# Patient Record
Sex: MALE | Race: OTHER | NOT HISPANIC OR LATINO | URBAN - METROPOLITAN AREA
[De-identification: names, ages, dates, MRNs, and addresses within clinical notes are randomized per-mention and may not be internally consistent; named-entity substitution may affect disease eponyms.]

---

## 2018-12-11 ENCOUNTER — EMERGENCY (EMERGENCY)
Facility: HOSPITAL | Age: 53
LOS: 1 days | Discharge: HOME | End: 2018-12-11
Attending: EMERGENCY MEDICINE
Payer: COMMERCIAL

## 2018-12-11 VITALS
SYSTOLIC BLOOD PRESSURE: 171 MMHG | TEMPERATURE: 98 F | WEIGHT: 143.96 LBS | OXYGEN SATURATION: 100 % | HEART RATE: 74 BPM | DIASTOLIC BLOOD PRESSURE: 116 MMHG | RESPIRATION RATE: 18 BRPM

## 2018-12-11 VITALS
SYSTOLIC BLOOD PRESSURE: 169 MMHG | TEMPERATURE: 98 F | RESPIRATION RATE: 17 BRPM | DIASTOLIC BLOOD PRESSURE: 92 MMHG | OXYGEN SATURATION: 98 % | HEART RATE: 84 BPM

## 2018-12-11 DIAGNOSIS — Z98.89 OTHER SPECIFIED POSTPROCEDURAL STATES: Chronic | ICD-10-CM

## 2018-12-11 DIAGNOSIS — N21.1 CALCULUS IN URETHRA: ICD-10-CM

## 2018-12-11 LAB
ALBUMIN SERPL ELPH-MCNC: 4.7 G/DL — SIGNIFICANT CHANGE UP (ref 3.3–5)
ALP SERPL-CCNC: 68 U/L — SIGNIFICANT CHANGE UP (ref 40–120)
ALT FLD-CCNC: 27 U/L — SIGNIFICANT CHANGE UP (ref 10–45)
ANION GAP SERPL CALC-SCNC: 15 MMOL/L — SIGNIFICANT CHANGE UP (ref 5–17)
APPEARANCE UR: CLEAR — SIGNIFICANT CHANGE UP
AST SERPL-CCNC: 18 U/L — SIGNIFICANT CHANGE UP (ref 10–40)
BACTERIA # UR AUTO: NEGATIVE — SIGNIFICANT CHANGE UP
BILIRUB SERPL-MCNC: 0.9 MG/DL — SIGNIFICANT CHANGE UP (ref 0.2–1.2)
BILIRUB UR-MCNC: NEGATIVE — SIGNIFICANT CHANGE UP
BUN SERPL-MCNC: 14 MG/DL — SIGNIFICANT CHANGE UP (ref 7–23)
CALCIUM SERPL-MCNC: 9.6 MG/DL — SIGNIFICANT CHANGE UP (ref 8.4–10.5)
CHLORIDE SERPL-SCNC: 99 MMOL/L — SIGNIFICANT CHANGE UP (ref 96–108)
CO2 SERPL-SCNC: 25 MMOL/L — SIGNIFICANT CHANGE UP (ref 22–31)
COLOR SPEC: YELLOW — SIGNIFICANT CHANGE UP
CREAT SERPL-MCNC: 0.89 MG/DL — SIGNIFICANT CHANGE UP (ref 0.5–1.3)
DIFF PNL FLD: ABNORMAL
EPI CELLS # UR: 0 — SIGNIFICANT CHANGE UP
GLUCOSE SERPL-MCNC: 106 MG/DL — HIGH (ref 70–99)
GLUCOSE UR QL: NEGATIVE — SIGNIFICANT CHANGE UP
HYALINE CASTS # UR AUTO: 0 /LPF — SIGNIFICANT CHANGE UP (ref 0–7)
KETONES UR-MCNC: NEGATIVE — SIGNIFICANT CHANGE UP
LEUKOCYTE ESTERASE UR-ACNC: NEGATIVE — SIGNIFICANT CHANGE UP
NITRITE UR-MCNC: NEGATIVE — SIGNIFICANT CHANGE UP
PH UR: 6 — SIGNIFICANT CHANGE UP (ref 5–8)
POTASSIUM SERPL-MCNC: 4.5 MMOL/L — SIGNIFICANT CHANGE UP (ref 3.5–5.3)
POTASSIUM SERPL-SCNC: 4.5 MMOL/L — SIGNIFICANT CHANGE UP (ref 3.5–5.3)
PROT SERPL-MCNC: 7 G/DL — SIGNIFICANT CHANGE UP (ref 6–8.3)
PROT UR-MCNC: NEGATIVE — SIGNIFICANT CHANGE UP
RBC CASTS # UR COMP ASSIST: 5 /HPF — HIGH (ref 0–4)
SODIUM SERPL-SCNC: 139 MMOL/L — SIGNIFICANT CHANGE UP (ref 135–145)
SP GR SPEC: 1.01 — SIGNIFICANT CHANGE UP (ref 1.01–1.02)
UROBILINOGEN FLD QL: NEGATIVE — SIGNIFICANT CHANGE UP
WBC UR QL: 2 /HPF — SIGNIFICANT CHANGE UP (ref 0–5)

## 2018-12-11 PROCEDURE — 74176 CT ABD & PELVIS W/O CONTRAST: CPT

## 2018-12-11 PROCEDURE — 80053 COMPREHEN METABOLIC PANEL: CPT

## 2018-12-11 PROCEDURE — 74176 CT ABD & PELVIS W/O CONTRAST: CPT | Mod: 26

## 2018-12-11 PROCEDURE — 51703 INSERT BLADDER CATH COMPLEX: CPT

## 2018-12-11 PROCEDURE — 87086 URINE CULTURE/COLONY COUNT: CPT

## 2018-12-11 PROCEDURE — 99284 EMERGENCY DEPT VISIT MOD MDM: CPT | Mod: 25

## 2018-12-11 PROCEDURE — 99284 EMERGENCY DEPT VISIT MOD MDM: CPT

## 2018-12-11 PROCEDURE — 99282 EMERGENCY DEPT VISIT SF MDM: CPT | Mod: 25

## 2018-12-11 PROCEDURE — 81001 URINALYSIS AUTO W/SCOPE: CPT

## 2018-12-11 PROCEDURE — 51702 INSERT TEMP BLADDER CATH: CPT

## 2018-12-11 RX ORDER — ACETAMINOPHEN 500 MG
975 TABLET ORAL ONCE
Qty: 0 | Refills: 0 | Status: COMPLETED | OUTPATIENT
Start: 2018-12-11 | End: 2018-12-11

## 2018-12-11 RX ORDER — CEPHALEXIN 500 MG
1 CAPSULE ORAL
Qty: 20 | Refills: 0 | OUTPATIENT
Start: 2018-12-11 | End: 2018-12-20

## 2018-12-11 RX ADMIN — Medication 975 MILLIGRAM(S): at 15:18

## 2018-12-11 NOTE — ED PROVIDER NOTE - PROGRESS NOTE DETAILS
dillard was placed by uro. per uro they believe that stone now in bladder, good flow, leg bag placed, pt instructed, f/u precaution, uro clinic for poss procedure and remove the dillard. abx at rec of uro.

## 2018-12-11 NOTE — ED PROVIDER NOTE - ATTENDING CONTRIBUTION TO CARE
uti symptoms but pt state it feels like his renal stones but in  bladder / penis.  no cvat, well appearing  ua, cr, ct.

## 2018-12-11 NOTE — ED PROVIDER NOTE - OBJECTIVE STATEMENT
52yo M pmhx nephrolithiasis HTN HLD p/w CC urinary frequency and dysuria x3 days - pt. explains he was seen by PMD for routine checkup 2 weeks ago, at the time was found to have evidence of UTI on UA but was asymptomatic at the time, pt finished a course of cipro last week, 3 days ago began to have symptoms of frequency, dysuria and loss of bladder control before making it to the bathroom. Denies fever chills cp sob n/v/d back pain numbness/tingling/weakness. No recent trauma/falls. No change in color of urine. Pt. is sexually active with women only.

## 2018-12-11 NOTE — ED ADULT NURSE NOTE - NSIMPLEMENTINTERV_GEN_ALL_ED
Implemented All Universal Safety Interventions:  [place] to call system. Call bell, personal items and telephone within reach. Instruct patient to call for assistance. Room bathroom lighting operational. Non-slip footwear when patient is off stretcher. Physically safe environment: no spills, clutter or unnecessary equipment. Stretcher in lowest position, wheels locked, appropriate side rails in place.

## 2018-12-11 NOTE — ED ADULT TRIAGE NOTE - CHIEF COMPLAINT QUOTE
Urinary urgency, burning, dribbling, frequency x 3 days. Last urinated this morning, 45 minutes ago.  No flank pain, n/v, fevers, chills. Pt has hx kidney stones, states it does not feel like that this time.  Pt saw PCP 10 days ago for a check up, UA showed mild infx, pt denies any sx before or at that time of appointment, pt completed 7 days of Cipro.

## 2018-12-11 NOTE — ED PROVIDER NOTE - MEDICAL DECISION MAKING DETAILS
52yo M pmhx nephrolithiasis HTN HLD p/w CC urinary frequency and dysuria x3 days - likely c/w UTI - suspicion for cord compression extremely low in setting of dysuria, no motor/sensory deficits, no back pain or trauma. Pt. does have history of renal stones, suspicion for stone would increase if hematuria present on UA.

## 2018-12-11 NOTE — PROCEDURE NOTE - ADDITIONAL PROCEDURE DETAILS
20Fr Mota catheter placed under usual sterile conditions. Limited pelvic US performed after catheter placement confirming the stone was pushed back into the urinary bladder

## 2018-12-11 NOTE — ED PROVIDER NOTE - CARE PROVIDER_API CALL
Karyna Lombardi (MD; MPH), Urology  [address]  [address] [place] [address]  [place] [address]  Phone: [phone]  Fax: [phone]

## 2018-12-11 NOTE — CONSULT NOTE ADULT - PROBLEM SELECTOR RECOMMENDATION 9
- 20Fr coude tip urethral catheter placed successfully under the usual sterile fashion  - abx x 3 days  for lower  tract manipulation  - Mota to gravity drainage  - f/u w/  as outpt for cystolitholapaxy

## 2018-12-11 NOTE — CONSULT NOTE ADULT - SUBJECTIVE AND OBJECTIVE BOX
HPI:  Patient is a 53y Male who presented with difficulty urinating. pt was in his usual state of health until approx 2 weeks ago when he noted some dysuria. he was seen by his PCP and told he had a UTI based on urinanalysis. He completed his course of antibiotics but he developing worsening dysuria, urinary frequency, and urinary urgency. At baseline, he does not endorse urinary frequency, urinary urgency, dysuria, weak urinary stream, urinary hesitancy, feelings of retention, or gross hematuria. In addition, he denies F/C, N/V, CP, SOB, abd pain, flank pain.    PAST MEDICAL & SURGICAL HISTORY:  Renal colic / nephrolithiasis  HLD (hyperlipidemia)  HTN (hypertension)  S/P correction of deviated nasal septum  S/P LASIK surgery    FAMILY HISTORY:  denies h/o  cancers    SOCIAL HISTORY:   Tobacco hx: denies    MEDICATIONS  (STANDING):  Crestor  Diovan      Allergies    No Known Allergies        REVIEW OF SYSTEMS: Pertinent positives and negatives as stated in HPI, otherwise negative    Vital signs  T(C): 36.6 (18 @ 08:04), Max: 36.6 (18 @ 08:04)  HR: 74 (18 @ 08:04)  BP: 171/116 (18 @ 08:04)  SpO2: 100% (18 @ 08:04)  Wt(kg): --    Output    Urine output not applicable    Physical Exam  Gen: NAD  Pulm: No respiratory distress, no subcostal retractions  CV: no JVD  Abd: Soft, NT, ND  : Uncircumcised, no lesions.  No discharge or blood at urethral meatus.  Testes descended bilaterally w/o masses or tenderness, Palpable bladder  Ext: No edema present    LABS:              139  |  99  |  14  ----------------------------<  106<H>  4.5   |  25  |  0.89    Ca    9.6      11 Dec 2018 09:19    TPro  7.0  /  Alb  4.7  /  TBili  0.9  /  DBili  x   /  AST  18  /  ALT  27  /  AlkPhos  68  12-      Urinalysis Basic - ( 11 Dec 2018 08:37 )    Color: Yellow / Appearance: Clear / S.010 / pH: x  Gluc: x / Ketone: Negative  / Bili: Negative / Urobili: Negative   Blood: x / Protein: Negative / Nitrite: Negative   Leuk Esterase: Negative / RBC: 5 /hpf / WBC 2 /HPF   Sq Epi: x / Non Sq Epi: 0 / Bacteria: Negative      RADIOLOGY:    RBUS (bedside): mild R sided hydronephrosis, hyperechogenic material see in the prostatic urethra w/ posterior acoustic shadowing    < from: CT Abdomen and Pelvis No Cont (12.11.18 @ 12:20) >  IMPRESSION:     Mild right hydroureteronephrosis. A 0.7 x 1.1 cm calculus is seen in the   prostatic urethra.    A 0.3 cm nonobstructing calculus is seen in the left kidney.     < end of copied text >

## 2018-12-11 NOTE — ED ADULT NURSE NOTE - OBJECTIVE STATEMENT
53 y.o male c c/o urinary frequency/dysuria x3 days. Pt recently finished course of Cipro last week. 3 days ago began to have symptoms of frequency/dysuria and loss of bladder control before making it to the bathroom. Denies fever/chills. Denies back pain. No CP/SOB. No recent trauma/falls. Sexually active with women only. No family at bedside.

## 2018-12-11 NOTE — ED ADULT NURSE NOTE - CAS ELECT INFOMATION PROVIDED
steady gait, no c/o, medicated for pain prior to d/c home, +driving home, educated on use of dillard/leg bag

## 2018-12-12 ENCOUNTER — APPOINTMENT (OUTPATIENT)
Age: 53
End: 2018-12-12
Payer: COMMERCIAL

## 2018-12-12 VITALS
TEMPERATURE: 98.5 F | HEIGHT: 60 IN | SYSTOLIC BLOOD PRESSURE: 144 MMHG | BODY MASS INDEX: 28.27 KG/M2 | DIASTOLIC BLOOD PRESSURE: 89 MMHG | RESPIRATION RATE: 17 BRPM | HEART RATE: 79 BPM | WEIGHT: 144 LBS

## 2018-12-12 DIAGNOSIS — Z78.9 OTHER SPECIFIED HEALTH STATUS: ICD-10-CM

## 2018-12-12 DIAGNOSIS — Z86.79 PERSONAL HISTORY OF OTHER DISEASES OF THE CIRCULATORY SYSTEM: ICD-10-CM

## 2018-12-12 PROBLEM — Z00.00 ENCOUNTER FOR PREVENTIVE HEALTH EXAMINATION: Status: ACTIVE | Noted: 2018-12-12

## 2018-12-12 LAB
CULTURE RESULTS: NO GROWTH — SIGNIFICANT CHANGE UP
SPECIMEN SOURCE: SIGNIFICANT CHANGE UP

## 2018-12-12 PROCEDURE — 99203 OFFICE O/P NEW LOW 30 MIN: CPT

## 2018-12-12 RX ORDER — VALSARTAN 80 MG/1
80 TABLET ORAL
Refills: 0 | Status: ACTIVE | COMMUNITY

## 2018-12-12 RX ORDER — ROSUVASTATIN CALCIUM 5 MG/1
5 TABLET, FILM COATED ORAL
Qty: 30 | Refills: 0 | Status: ACTIVE | COMMUNITY
Start: 2018-11-14

## 2018-12-12 RX ORDER — IBUPROFEN 600 MG
600 TABLET ORAL
Refills: 0 | Status: ACTIVE | COMMUNITY

## 2018-12-12 RX ORDER — ROSUVASTATIN CALCIUM 5 MG/1
5 TABLET, FILM COATED ORAL
Refills: 0 | Status: ACTIVE | COMMUNITY

## 2018-12-12 RX ORDER — VALSARTAN 80 MG/1
80 TABLET, COATED ORAL
Qty: 90 | Refills: 0 | Status: ACTIVE | COMMUNITY
Start: 2018-06-25

## 2018-12-12 RX ORDER — LOSARTAN POTASSIUM AND HYDROCHLOROTHIAZIDE 12.5; 5 MG/1; MG/1
50-12.5 TABLET ORAL
Qty: 30 | Refills: 0 | Status: ACTIVE | COMMUNITY
Start: 2018-11-14

## 2018-12-17 ENCOUNTER — TRANSCRIPTION ENCOUNTER (OUTPATIENT)
Age: 53
End: 2018-12-17

## 2018-12-18 ENCOUNTER — OUTPATIENT (OUTPATIENT)
Dept: OUTPATIENT SERVICES | Facility: HOSPITAL | Age: 53
LOS: 1 days | End: 2018-12-18
Payer: COMMERCIAL

## 2018-12-18 ENCOUNTER — RESULT REVIEW (OUTPATIENT)
Age: 53
End: 2018-12-18

## 2018-12-18 ENCOUNTER — APPOINTMENT (OUTPATIENT)
Dept: UROLOGY | Facility: HOSPITAL | Age: 53
End: 2018-12-18

## 2018-12-18 VITALS
TEMPERATURE: 99 F | OXYGEN SATURATION: 98 % | RESPIRATION RATE: 18 BRPM | DIASTOLIC BLOOD PRESSURE: 77 MMHG | HEART RATE: 81 BPM | SYSTOLIC BLOOD PRESSURE: 118 MMHG

## 2018-12-18 VITALS
HEIGHT: 60 IN | SYSTOLIC BLOOD PRESSURE: 148 MMHG | HEART RATE: 89 BPM | TEMPERATURE: 98 F | DIASTOLIC BLOOD PRESSURE: 100 MMHG | RESPIRATION RATE: 18 BRPM | WEIGHT: 143.96 LBS | OXYGEN SATURATION: 100 %

## 2018-12-18 DIAGNOSIS — Z98.89 OTHER SPECIFIED POSTPROCEDURAL STATES: Chronic | ICD-10-CM

## 2018-12-18 DIAGNOSIS — N21.0 CALCULUS IN BLADDER: ICD-10-CM

## 2018-12-18 DIAGNOSIS — Z01.818 ENCOUNTER FOR OTHER PREPROCEDURAL EXAMINATION: ICD-10-CM

## 2018-12-18 PROCEDURE — 88300 SURGICAL PATH GROSS: CPT | Mod: 26

## 2018-12-18 PROCEDURE — 52317 REMOVE BLADDER STONE: CPT

## 2018-12-18 RX ORDER — CEPHALEXIN 500 MG
1 CAPSULE ORAL
Qty: 9 | Refills: 0
Start: 2018-12-18 | End: 2018-12-20

## 2018-12-18 RX ORDER — ACETAMINOPHEN 500 MG
1000 TABLET ORAL ONCE
Qty: 0 | Refills: 0 | Status: COMPLETED | OUTPATIENT
Start: 2018-12-18 | End: 2018-12-18

## 2018-12-18 RX ORDER — SODIUM CHLORIDE 9 MG/ML
1000 INJECTION, SOLUTION INTRAVENOUS
Qty: 0 | Refills: 0 | Status: DISCONTINUED | OUTPATIENT
Start: 2018-12-18 | End: 2019-01-02

## 2018-12-18 RX ORDER — HYDROMORPHONE HYDROCHLORIDE 2 MG/ML
0.5 INJECTION INTRAMUSCULAR; INTRAVENOUS; SUBCUTANEOUS
Qty: 0 | Refills: 0 | Status: DISCONTINUED | OUTPATIENT
Start: 2018-12-18 | End: 2018-12-18

## 2018-12-18 RX ORDER — CEPHALEXIN 500 MG
1 CAPSULE ORAL
Qty: 9 | Refills: 0 | OUTPATIENT
Start: 2018-12-18 | End: 2018-12-20

## 2018-12-18 RX ORDER — SODIUM CHLORIDE 9 MG/ML
3 INJECTION INTRAMUSCULAR; INTRAVENOUS; SUBCUTANEOUS EVERY 8 HOURS
Qty: 0 | Refills: 0 | Status: DISCONTINUED | OUTPATIENT
Start: 2018-12-18 | End: 2018-12-18

## 2018-12-18 RX ORDER — HYDROMORPHONE HYDROCHLORIDE 2 MG/ML
0.25 INJECTION INTRAMUSCULAR; INTRAVENOUS; SUBCUTANEOUS
Qty: 0 | Refills: 0 | Status: DISCONTINUED | OUTPATIENT
Start: 2018-12-18 | End: 2018-12-18

## 2018-12-18 RX ORDER — ONDANSETRON 8 MG/1
4 TABLET, FILM COATED ORAL ONCE
Qty: 0 | Refills: 0 | Status: DISCONTINUED | OUTPATIENT
Start: 2018-12-18 | End: 2018-12-18

## 2018-12-18 RX ADMIN — Medication 1000 MILLIGRAM(S): at 17:34

## 2018-12-18 RX ADMIN — Medication 400 MILLIGRAM(S): at 17:31

## 2018-12-18 RX ADMIN — SODIUM CHLORIDE 120 MILLILITER(S): 9 INJECTION, SOLUTION INTRAVENOUS at 17:35

## 2018-12-18 NOTE — ASU DISCHARGE PLAN (ADULT/PEDIATRIC). - ITEMS TO FOLLOWUP WITH YOUR PHYSICIAN'S
Please follow up with Dr. Hoenig within 2-3 weeks after discharge from the hospital. You may call  to schedule an appointment.

## 2018-12-18 NOTE — ASU DISCHARGE PLAN (ADULT/PEDIATRIC). - MEDICATION SUMMARY - MEDICATIONS TO TAKE
I will START or STAY ON the medications listed below when I get home from the hospital:    aspirin 81 mg oral delayed release tablet  -- 1 tab(s) by mouth once a day  -- Indication: For home med    Diovan 160 mg oral tablet  -- 1 tab(s) by mouth once a day  -- Indication: For home med    Crestor 10 mg oral tablet  -- 1 tab(s) by mouth once a day (at bedtime)  -- Indication: For home med    Keflex 500 mg oral capsule  -- 1 cap(s) by mouth 3 times a day   -- Finish all this medication unless otherwise directed by prescriber.    -- Indication: For prevention

## 2018-12-18 NOTE — BRIEF OPERATIVE NOTE - DISPOSITION
Patient called to cancel appointment due to lumbar pain.  No therapy services provided on this date.   
home

## 2018-12-18 NOTE — H&P PST ADULT - HISTORY OF PRESENT ILLNESS
BELOW from 18 ED assessment by urology. Pt currently in normal state of health without complaints.     HPI:  Patient is a 53y Male who presented with difficulty urinating. pt was in his usual state of health until approx 2 weeks ago when he noted some dysuria. he was seen by his PCP and told he had a UTI based on urinanalysis. He completed his course of antibiotics but he developing worsening dysuria, urinary frequency, and urinary urgency. At baseline, he does not endorse urinary frequency, urinary urgency, dysuria, weak urinary stream, urinary hesitancy, feelings of retention, or gross hematuria. In addition, he denies F/C, N/V, CP, SOB, abd pain, flank pain.    PAST MEDICAL & SURGICAL HISTORY:  Renal colic / nephrolithiasis  HLD (hyperlipidemia)  HTN (hypertension)  S/P correction of deviated nasal septum  S/P LASIK surgery    FAMILY HISTORY:  denies h/o  cancers    SOCIAL HISTORY:   Tobacco hx: denies    MEDICATIONS  (STANDING):  Crestor  Diovan      Allergies    No Known Allergies        REVIEW OF SYSTEMS: Pertinent positives and negatives as stated in HPI, otherwise negative    Vital signs  T(C): 36.6 (18 @ 08:04), Max: 36.6 (18 @ 08:04)  HR: 74 (18 @ 08:04)  BP: 171/116 (18 @ 08:04)  SpO2: 100% (18 @ 08:04)  Wt(kg): --    Output    Urine output not applicable    Physical Exam  Gen: NAD  Pulm: No respiratory distress, no subcostal retractions  CV: no JVD  Abd: Soft, NT, ND  : Uncircumcised, no lesions.  No discharge or blood at urethral meatus.  Testes descended bilaterally w/o masses or tenderness, Palpable bladder  Ext: No edema present    LABS:              139  |  99  |  14  ----------------------------<  106<H>  4.5   |  25  |  0.89    Ca    9.6      11 Dec 2018 09:19    TPro  7.0  /  Alb  4.7  /  TBili  0.9  /  DBili  x   /  AST  18  /  ALT  27  /  AlkPhos  68        Urinalysis Basic - ( 11 Dec 2018 08:37 )    Color: Yellow / Appearance: Clear / S.010 / pH: x  Gluc: x / Ketone: Negative  / Bili: Negative / Urobili: Negative   Blood: x / Protein: Negative / Nitrite: Negative   Leuk Esterase: Negative / RBC: 5 /hpf / WBC 2 /HPF   Sq Epi: x / Non Sq Epi: 0 / Bacteria: Negative    urine cx: negative     RADIOLOGY:    RBUS (bedside): mild R sided hydronephrosis, hyperechogenic material see in the prostatic urethra w/ posterior acoustic shadowing    < from: CT Abdomen and Pelvis No Cont (18 @ 12:20) >  IMPRESSION:     Mild right hydroureteronephrosis. A 0.7 x 1.1 cm calculus is seen in the   prostatic urethra.    A 0.3 cm nonobstructing calculus is seen in the left kidney.     < end of copied text >        Assessment and Recommendation:   · Assessment	  52 y/o M with bladder stone, presenting for elective cystolitholapaxy  - NPO  - OR  - IV insertion

## 2018-12-18 NOTE — H&P PST ADULT - ATTENDING COMMENTS
Pt with dillard due to bladder/prostatic urethral obstruction by passing stone causing retention.  For cystolithalopaxy

## 2018-12-19 PROCEDURE — 88300 SURGICAL PATH GROSS: CPT

## 2018-12-19 PROCEDURE — 52317 REMOVE BLADDER STONE: CPT

## 2018-12-19 PROCEDURE — C1889: CPT

## 2018-12-19 PROCEDURE — 82365 CALCULUS SPECTROSCOPY: CPT

## 2018-12-27 LAB — COMPN STONE: SIGNIFICANT CHANGE UP

## 2019-01-02 LAB — SURGICAL PATHOLOGY STUDY: SIGNIFICANT CHANGE UP

## 2019-02-01 ENCOUNTER — APPOINTMENT (OUTPATIENT)
Dept: UROLOGY | Facility: CLINIC | Age: 54
End: 2019-02-01
Payer: COMMERCIAL

## 2019-02-01 DIAGNOSIS — N21.0 CALCULUS IN BLADDER: ICD-10-CM

## 2019-02-01 PROCEDURE — 99214 OFFICE O/P EST MOD 30 MIN: CPT

## 2019-02-01 RX ORDER — CIPROFLOXACIN HYDROCHLORIDE 250 MG/1
250 TABLET, FILM COATED ORAL
Qty: 14 | Refills: 0 | Status: COMPLETED | COMMUNITY
Start: 2018-11-28 | End: 2019-02-01

## 2019-02-01 NOTE — HISTORY OF PRESENT ILLNESS
[FreeTextEntry1] : Pt is 54 yo male with h/o recurrent stones. Most recently had pain, urge to urinate and difficulty controlling yesterday; presentation to [facility] ER and found to have right hydro, stone passed to prostatic urethra 8 mm x 13 mm. Also with left lower pole stone, 3x5 mm. Density of stone high on CT.\par \par Pt returns for metabolic evaluation and prevention of future stone disease following recent surgery for stone.  At issue today is review of the stone analysis, risk factors for future stone episodes and establishing whether they have pure dietary, metabolic, or mixed causes for their stone risks which is unrelated to the surgical management of their stone disease.\par \par They underwent cystolithalopaxy on  12/18/18- returns for first visit\par \par Stone analysis:Mixed calcium stone, COM, COD, CP\par \par Feeling well, occasional mild 'sensation' with urination.\par  [None] : no symptoms

## 2019-02-01 NOTE — LETTER BODY
[Dear  ___] : Dear  [unfilled], [Consult Letter:] : I had the pleasure of evaluating your patient, [unfilled]. [( Thank you for referring [unfilled] for consultation for _____ )] : Thank you for referring [unfilled] for consultation for [unfilled] [Please see my note below.] : Please see my note below. [Consult Closing:] : Thank you very much for allowing me to participate in the care of this patient.  If you have any questions, please do not hesitate to contact me. [Sincerely,] : Sincerely, [FreeTextEntry1] : Pt is 54 yo male with h/o recurrent stones. Most recently had pain, urge to urinate and difficulty controlling yesterday; presentation to [facility] ER and found to have right hydro, stone passed to prostatic urethra 8 mm x 13 mm. Also with left lower pole stone, 3x5 mm. Density of stone high on CT.\par \par Pt returns for metabolic evaluation and prevention of future stone disease following recent surgery for stone.  At issue today is review of the stone analysis, risk factors for future stone episodes and establishing whether they have pure dietary, metabolic, or mixed causes for their stone risks which is unrelated to the surgical management of their stone disease.\par \par They underwent cystolithalopaxy on  12/18/18- returns for first visit\par \par Stone analysis:Mixed calcium stone, COM, COD, CP\par \par Feeling well, occasional mild 'sensation' with urination.\par \par Diet modification reviewed at length- increasing fluids (primarily water), citrus is good, and decreasing/moderating salt, animal flesh protein, oxalate containing foods, and moderation of calcium intake (1000 mg/day is USRDA).\par \par I reviewed with the patient the risks of metabolic stone disease given their underlying risk parameters (all of which include large stones, multiple stones, bilateral stones, family history, and young age), and the indications for 24 hour urine metabolic assessment.\par \par We also discussed benefits of regular exercise and weight loss as independent risk reducers for stones.\par \par 1. Diet modification as discussed\par 2. 24 hour urine collection x 2 in the next few weeks\par 3. RTC with renal US in 8 to 10 weeks\par 4. urine for C&S today\par  [DrJuan Francisco  ___] : Dr. PATIÑO [DrJuan Francisco ___] : Dr. PATIÑO

## 2019-02-01 NOTE — ASSESSMENT
[FreeTextEntry1] : Diet modification reviewed at length- increasing fluids (primarily water), citrus is good, and decreasing/moderating salt, animal flesh protein, oxalate containing foods, and moderation of calcium intake (1000 mg/day is USRDA).\par \par I reviewed with the patient the risks of metabolic stone disease given their underlying risk parameters (all of which include large stones, multiple stones, bilateral stones, family history, and young age), and the indications for 24 hour urine metabolic assessment.\par \par We also discussed benefits of regular exercise and weight loss as independent risk reducers for stones.\par No current indication to treat small left renal calculus- will reassess.\par \par 1. Diet modification as discussed\par 2. 24 hour urine collection x 2 in the next few weeks\par 3. RTC with renal US in 8 to 10 weeks\par 4. urine for C&S today\par

## 2019-02-05 LAB — BACTERIA UR CULT: NORMAL

## 2019-04-17 ENCOUNTER — APPOINTMENT (OUTPATIENT)
Dept: UROLOGY | Facility: CLINIC | Age: 54
End: 2019-04-17

## 2023-06-24 ENCOUNTER — EMERGENCY (EMERGENCY)
Facility: HOSPITAL | Age: 58
LOS: 1 days | Discharge: HOME | End: 2023-06-24
Attending: EMERGENCY MEDICINE
Payer: COMMERCIAL

## 2023-06-24 VITALS
TEMPERATURE: 99 F | RESPIRATION RATE: 18 BRPM | HEART RATE: 95 BPM | OXYGEN SATURATION: 98 % | SYSTOLIC BLOOD PRESSURE: 148 MMHG | DIASTOLIC BLOOD PRESSURE: 98 MMHG | WEIGHT: 139.99 LBS | HEIGHT: 60 IN

## 2023-06-24 VITALS
HEART RATE: 82 BPM | TEMPERATURE: 98 F | OXYGEN SATURATION: 100 % | RESPIRATION RATE: 16 BRPM | DIASTOLIC BLOOD PRESSURE: 92 MMHG | SYSTOLIC BLOOD PRESSURE: 130 MMHG

## 2023-06-24 DIAGNOSIS — Z98.89 OTHER SPECIFIED POSTPROCEDURAL STATES: Chronic | ICD-10-CM

## 2023-06-24 LAB
ALBUMIN SERPL ELPH-MCNC: 4.6 G/DL — SIGNIFICANT CHANGE UP (ref 3.3–5)
ALP SERPL-CCNC: 78 U/L — SIGNIFICANT CHANGE UP (ref 40–120)
ALT FLD-CCNC: 23 U/L — SIGNIFICANT CHANGE UP (ref 10–45)
ANION GAP SERPL CALC-SCNC: 12 MMOL/L — SIGNIFICANT CHANGE UP (ref 5–17)
APPEARANCE UR: CLEAR — SIGNIFICANT CHANGE UP
AST SERPL-CCNC: 20 U/L — SIGNIFICANT CHANGE UP (ref 10–40)
BACTERIA # UR AUTO: NEGATIVE — SIGNIFICANT CHANGE UP
BASOPHILS # BLD AUTO: 0.09 K/UL — SIGNIFICANT CHANGE UP (ref 0–0.2)
BASOPHILS NFR BLD AUTO: 0.9 % — SIGNIFICANT CHANGE UP (ref 0–2)
BILIRUB SERPL-MCNC: 0.5 MG/DL — SIGNIFICANT CHANGE UP (ref 0.2–1.2)
BILIRUB UR-MCNC: NEGATIVE — SIGNIFICANT CHANGE UP
BUN SERPL-MCNC: 16 MG/DL — SIGNIFICANT CHANGE UP (ref 7–23)
CALCIUM SERPL-MCNC: 9.2 MG/DL — SIGNIFICANT CHANGE UP (ref 8.4–10.5)
CHLORIDE SERPL-SCNC: 99 MMOL/L — SIGNIFICANT CHANGE UP (ref 96–108)
CO2 SERPL-SCNC: 26 MMOL/L — SIGNIFICANT CHANGE UP (ref 22–31)
COLOR SPEC: SIGNIFICANT CHANGE UP
CREAT SERPL-MCNC: 1.02 MG/DL — SIGNIFICANT CHANGE UP (ref 0.5–1.3)
DIFF PNL FLD: ABNORMAL
EGFR: 86 ML/MIN/1.73M2 — SIGNIFICANT CHANGE UP
EOSINOPHIL # BLD AUTO: 0.39 K/UL — SIGNIFICANT CHANGE UP (ref 0–0.5)
EOSINOPHIL NFR BLD AUTO: 4 % — SIGNIFICANT CHANGE UP (ref 0–6)
EPI CELLS # UR: 4 /HPF — SIGNIFICANT CHANGE UP
GLUCOSE SERPL-MCNC: 110 MG/DL — HIGH (ref 70–99)
GLUCOSE UR QL: NEGATIVE — SIGNIFICANT CHANGE UP
HCT VFR BLD CALC: 43.3 % — SIGNIFICANT CHANGE UP (ref 39–50)
HGB BLD-MCNC: 14.8 G/DL — SIGNIFICANT CHANGE UP (ref 13–17)
HYALINE CASTS # UR AUTO: 1 /LPF — SIGNIFICANT CHANGE UP (ref 0–2)
IMM GRANULOCYTES NFR BLD AUTO: 0.5 % — SIGNIFICANT CHANGE UP (ref 0–0.9)
KETONES UR-MCNC: NEGATIVE — SIGNIFICANT CHANGE UP
LEUKOCYTE ESTERASE UR-ACNC: ABNORMAL
LYMPHOCYTES # BLD AUTO: 2.37 K/UL — SIGNIFICANT CHANGE UP (ref 1–3.3)
LYMPHOCYTES # BLD AUTO: 24.3 % — SIGNIFICANT CHANGE UP (ref 13–44)
MCHC RBC-ENTMCNC: 30.5 PG — SIGNIFICANT CHANGE UP (ref 27–34)
MCHC RBC-ENTMCNC: 34.2 GM/DL — SIGNIFICANT CHANGE UP (ref 32–36)
MCV RBC AUTO: 89.1 FL — SIGNIFICANT CHANGE UP (ref 80–100)
MONOCYTES # BLD AUTO: 0.72 K/UL — SIGNIFICANT CHANGE UP (ref 0–0.9)
MONOCYTES NFR BLD AUTO: 7.4 % — SIGNIFICANT CHANGE UP (ref 2–14)
NEUTROPHILS # BLD AUTO: 6.15 K/UL — SIGNIFICANT CHANGE UP (ref 1.8–7.4)
NEUTROPHILS NFR BLD AUTO: 62.9 % — SIGNIFICANT CHANGE UP (ref 43–77)
NITRITE UR-MCNC: NEGATIVE — SIGNIFICANT CHANGE UP
NRBC # BLD: 0 /100 WBCS — SIGNIFICANT CHANGE UP (ref 0–0)
PH UR: 6.5 — SIGNIFICANT CHANGE UP (ref 5–8)
PLATELET # BLD AUTO: 344 K/UL — SIGNIFICANT CHANGE UP (ref 150–400)
POTASSIUM SERPL-MCNC: 4.4 MMOL/L — SIGNIFICANT CHANGE UP (ref 3.5–5.3)
POTASSIUM SERPL-SCNC: 4.4 MMOL/L — SIGNIFICANT CHANGE UP (ref 3.5–5.3)
PROT SERPL-MCNC: 7.2 G/DL — SIGNIFICANT CHANGE UP (ref 6–8.3)
PROT UR-MCNC: NEGATIVE — SIGNIFICANT CHANGE UP
RBC # BLD: 4.86 M/UL — SIGNIFICANT CHANGE UP (ref 4.2–5.8)
RBC # FLD: 12.2 % — SIGNIFICANT CHANGE UP (ref 10.3–14.5)
RBC CASTS # UR COMP ASSIST: 3 /HPF — SIGNIFICANT CHANGE UP (ref 0–4)
SODIUM SERPL-SCNC: 137 MMOL/L — SIGNIFICANT CHANGE UP (ref 135–145)
SP GR SPEC: 1.01 — LOW (ref 1.01–1.02)
UROBILINOGEN FLD QL: NEGATIVE — SIGNIFICANT CHANGE UP
WBC # BLD: 9.77 K/UL — SIGNIFICANT CHANGE UP (ref 3.8–10.5)
WBC # FLD AUTO: 9.77 K/UL — SIGNIFICANT CHANGE UP (ref 3.8–10.5)
WBC UR QL: 47 /HPF — HIGH (ref 0–5)

## 2023-06-24 PROCEDURE — 80053 COMPREHEN METABOLIC PANEL: CPT

## 2023-06-24 PROCEDURE — 76775 US EXAM ABDO BACK WALL LIM: CPT | Mod: 26

## 2023-06-24 PROCEDURE — 87086 URINE CULTURE/COLONY COUNT: CPT

## 2023-06-24 PROCEDURE — 99284 EMERGENCY DEPT VISIT MOD MDM: CPT | Mod: 25

## 2023-06-24 PROCEDURE — 76775 US EXAM ABDO BACK WALL LIM: CPT

## 2023-06-24 PROCEDURE — 99285 EMERGENCY DEPT VISIT HI MDM: CPT

## 2023-06-24 PROCEDURE — 85025 COMPLETE CBC W/AUTO DIFF WBC: CPT

## 2023-06-24 PROCEDURE — 81001 URINALYSIS AUTO W/SCOPE: CPT

## 2023-06-24 RX ORDER — SODIUM CHLORIDE 9 MG/ML
1000 INJECTION INTRAMUSCULAR; INTRAVENOUS; SUBCUTANEOUS ONCE
Refills: 0 | Status: COMPLETED | OUTPATIENT
Start: 2023-06-24 | End: 2023-06-24

## 2023-06-24 RX ADMIN — SODIUM CHLORIDE 1000 MILLILITER(S): 9 INJECTION INTRAMUSCULAR; INTRAVENOUS; SUBCUTANEOUS at 08:47

## 2023-06-24 RX ADMIN — Medication 1 TABLET(S): at 11:06

## 2023-06-24 NOTE — ED PROVIDER NOTE - CARE PLAN
1 Principal Discharge DX:	Hematuria   Principal Discharge DX:	Urinary tract infection  Secondary Diagnosis:	Hematuria  Secondary Diagnosis:	Hydronephrosis

## 2023-06-24 NOTE — ED PROVIDER NOTE - OBJECTIVE STATEMENT
57-year-old male with past medical history of ureterolithiasis managed operatively, resents to the emergency department due to 1 week of dysuria associated with worsening hematuria.  He endorses left lower quadrant and suprapubic pain which she describes as similar in nature to when he had his previous stone.  He has not required over-the-counter medication as the pain is only significant when urinating.  He denies any measured fevers, constipation, diarrhea, hematochezia.  He further denies any penile discharge.

## 2023-06-24 NOTE — ED PROVIDER NOTE - CLINICAL SUMMARY MEDICAL DECISION MAKING FREE TEXT BOX
57-year-old male with history of stones presenting with signs and symptoms consistent with previous visit.  Most likely diagnosis is repeat stone.  Other diagnoses considered include aortic dissection, diverticulitis, urethritis.  There is no indication for CT imaging at this time as index of suspicion is low for dissection and he has previous CT indicating presence of stone.  We will obtain ultrasound to assess degree of hydronephrosis.  Metabolic panel to assess renal function, IV hydration to ease and passage of stone.  Patient not requesting pain medication at this time.  Urology to be consulted as indicated.

## 2023-06-24 NOTE — ED PROVIDER NOTE - NSFOLLOWUPINSTRUCTIONS_ED_ALL_ED_FT
You were seen in the emergency department for kidney stones. We have evaluated you and determined that you do not require further hospital interventions.    During your stay you had the following relevant results:     Please follow up with a UROLOGIST as necessary to discuss the results of your stay in our department.    If you start to experience worsening symptoms such as fevers or chills, severe pain, chest pain or shortness of breath, please return to the emergency department for further evaluation. You were seen in the emergency department for kidney stones. We have evaluated you and determined that you do not require further hospital interventions.    During your stay you had the following relevant results: an ultrasound that showed hydronephrosis (dilation) in both kidneys, urine studies that show evidence of a urinary tract infection    Please follow up with a UROLOGIST as necessary to discuss the results of your stay in our department.    You may continue to experience pain after being discharged.  For your pain, you can take 2 tablets (1000 mg) of acetaminophen (brand name Tylenol®) every 6 hours.  If you still have pain, you can also take 2 tablets (400 mg) of ibuprofen (brand names Motrin® or Advil®) every 6 hours.  For better pain control, it is recommended that you alternate these medications every 3 hours.  You should not take more than 4 doses of each medication in a 24-hour period.    If you start to experience worsening symptoms such as fevers or chills, severe pain, chest pain or shortness of breath, please return to the emergency department for further evaluation.

## 2023-06-24 NOTE — ED ADULT NURSE NOTE - NSFALLUNIVINTERV_ED_ALL_ED
Bed/Stretcher in lowest position, wheels locked, appropriate side rails in place/Call bell, personal items and telephone in reach/Instruct patient to call for assistance before getting out of bed/chair/stretcher/Non-slip footwear applied when patient is off stretcher/[place] to call system/Physically safe environment - no spills, clutter or unnecessary equipment/Purposeful proactive rounding/Room/bathroom lighting operational, light cord in reach

## 2023-06-24 NOTE — ED PROVIDER NOTE - PROGRESS NOTE DETAILS
Patient noted to have bilateral hydronephrosis on ultrasound, left greater than right.  Upon review of prior CT scan patient with prior mild right-sided hydronephrosis with no obstructing stone on prior scan.  Moderate left hydro on today's ultrasound is new compared to prior CT.  UA pending. Shubham Araujo MD: Patient's labs and/or imaging have been reviewed. UA shows cystitis, labs otherwise not suggestion pyelonephritis. Patient to be discharged with uro follow up.

## 2023-06-24 NOTE — ED PROVIDER NOTE - PHYSICAL EXAMINATION
General: alert, oriented to person, time, place  Psych: mood appropriate  Head: normocephalic; atraumatic  Eyes: PERRLA, EOMI, conjunctivae clear bilaterally, sclerae anicteric  ENT: no nasal flaring, patent nares  Cardio: RRR, no m/r/g, PT/DP pulses 2+ b/l  Resp: CATB, no w/r/r  GI: suprapubic/LLQ tenderness; soft, nondistended   : no CVA tenderness  Neuro: normal sensation, moving all four extremities equally  Skin: No evidence of rash or bruising  MSK: normal movement of all extremities  Lymph/Vasc: no LE edema

## 2023-06-24 NOTE — ED ADULT NURSE NOTE - OBJECTIVE STATEMENT
C/o Hematuria since Monday, LLQ/lower mid abdominal pain x1 week, Dysuria since last night. Hx of Kidney stones, reports that symptoms are similar. Rep[orts subjective fever at home.

## 2023-06-24 NOTE — ED PROVIDER NOTE - ATTENDING CONTRIBUTION TO CARE
57-year-old male with history of prior kidney stones presenting with 1 week of dysuria and intermittent gross hematuria.  Feels like prior stones.  Reports he has had stones in the past that he has passed spontaneously.  Denies fever, vomiting.  Has had pain relief with over-the-counter pain medications.  Examination as above.  Likely recurrent stone.  Will obtain labs, urinalysis and urine culture, renal ultrasound to evaluate for hydro-, review prior ED visit records and reassess

## 2023-06-24 NOTE — ED PROVIDER NOTE - PATIENT PORTAL LINK FT
You can access the FollowMyHealth Patient Portal offered by [facility] by registering at the following website: [email or web address]. By joining [employer]’s FollowMyHealth portal, you will also be able to view your health information using other applications (apps) compatible with our system.

## 2023-06-25 LAB
CULTURE RESULTS: NO GROWTH — SIGNIFICANT CHANGE UP
SPECIMEN SOURCE: SIGNIFICANT CHANGE UP

## 2023-07-07 ENCOUNTER — APPOINTMENT (OUTPATIENT)
Dept: UROLOGY | Facility: CLINIC | Age: 58
End: 2023-07-07
Payer: COMMERCIAL

## 2023-07-07 VITALS
HEIGHT: 60 IN | TEMPERATURE: 98 F | SYSTOLIC BLOOD PRESSURE: 120 MMHG | WEIGHT: 144 LBS | BODY MASS INDEX: 28.27 KG/M2 | RESPIRATION RATE: 16 BRPM | HEART RATE: 83 BPM | DIASTOLIC BLOOD PRESSURE: 85 MMHG | OXYGEN SATURATION: 98 %

## 2023-07-07 DIAGNOSIS — N13.30 UNSPECIFIED HYDRONEPHROSIS: ICD-10-CM

## 2023-07-07 PROCEDURE — 99204 OFFICE O/P NEW MOD 45 MIN: CPT

## 2023-07-07 NOTE — ASSESSMENT
[FreeTextEntry1] : 57-year-old male with history of nephrolithiasis and bladder stones.  He was seen in the ED for severe flank pain and found on ultrasound to have bilateral hydro with bilateral ureteral jets.  His creatinine was WNL and he has continued to make urine.  He states his pain is improved but he has not been taking tamsulosin.  I will start him on medical expulsive therapy with the assumption that he has an obstructing stone.  He will also undergo a CT abdomen pelvis without contrast to assess for stones or cause of hydronephrosis.  He was informed that if he develops fevers or stops making urine to go to the ED.  He will follow-up next week to review imaging.\par \par Plan\par Urinalysis\par Urine culture\par CT abdomen pelvis without contrast\par Tamsulosin 0.4 mg daily\par Follow-up in 1 week

## 2023-07-07 NOTE — HISTORY OF PRESENT ILLNESS
[FreeTextEntry1] : 56 yo male with recurrent nephrolithiasis. He passed a stone about 8 years ago and had a cystolitholapaxy in 2018. He presented to the ED 2 weeks ago with severe left flank pain. renal us showing bilateral hydro but his cr was wnl and he was making urine so he was dc on abx. His urine cx was negative. \par \par Since visiting the ED he has intermittent urgency with slow urination. PVR: 15 mL

## 2023-07-07 NOTE — PHYSICAL EXAM
[Normal Appearance] : normal appearance [Edema] : no peripheral edema [Abdomen Soft] : soft [Urinary Bladder Findings] : the bladder was normal on palpation [Normal Station and Gait] : the gait and station were normal for the patient's age [] : no rash [No Focal Deficits] : no focal deficits [Not Anxious] : not anxious [No Palpable Adenopathy] : no palpable adenopathy

## 2023-07-10 LAB
APPEARANCE: CLEAR
BACTERIA UR CULT: NORMAL
BACTERIA: NEGATIVE /HPF
BILIRUBIN URINE: NEGATIVE
BLOOD URINE: ABNORMAL
CAST: 0 /LPF
COLOR: YELLOW
EPITHELIAL CELLS: 1 /HPF
GLUCOSE QUALITATIVE U: NEGATIVE MG/DL
KETONES URINE: NEGATIVE MG/DL
LEUKOCYTE ESTERASE URINE: ABNORMAL
MICROSCOPIC-UA: NORMAL
NITRITE URINE: NEGATIVE
PH URINE: 6
PROTEIN URINE: NEGATIVE MG/DL
RED BLOOD CELLS URINE: 1 /HPF
SPECIFIC GRAVITY URINE: 1.01
UROBILINOGEN URINE: 0.2 MG/DL
WHITE BLOOD CELLS URINE: 82 /HPF

## 2023-07-14 ENCOUNTER — APPOINTMENT (OUTPATIENT)
Dept: UROLOGY | Facility: CLINIC | Age: 58
End: 2023-07-14
Payer: COMMERCIAL

## 2023-07-14 VITALS
SYSTOLIC BLOOD PRESSURE: 114 MMHG | RESPIRATION RATE: 16 BRPM | OXYGEN SATURATION: 93 % | TEMPERATURE: 98.2 F | DIASTOLIC BLOOD PRESSURE: 78 MMHG | WEIGHT: 144 LBS | HEIGHT: 67 IN | BODY MASS INDEX: 22.6 KG/M2 | HEART RATE: 98 BPM

## 2023-07-14 PROCEDURE — 99214 OFFICE O/P EST MOD 30 MIN: CPT

## 2023-07-14 NOTE — PHYSICAL EXAM
[Normal Appearance] : normal appearance [Abdomen Soft] : soft [Urinary Bladder Findings] : the bladder was normal on palpation [Edema] : no peripheral edema [] : no respiratory distress [Not Anxious] : not anxious [Normal Station and Gait] : the gait and station were normal for the patient's age [No Focal Deficits] : no focal deficits [No Palpable Adenopathy] : no palpable adenopathy

## 2023-07-14 NOTE — ASSESSMENT
[FreeTextEntry1] : 57-year-old male with history of nephrolithiasis and bladder stones.  His CT abdomen pelvis from July 14, 2023 demonstrates a left obstructing ureteral calculus and bilateral nephrolithiasis.  We discussed options for definitive stone management and the patient agrees to staged ureteroscopy starting with the left.\par \par Discussed options for management of the patient's ureteral stone.  We discussed surgical options including ureteroscopy and shock wave lithotripsy.  In addition we discussed conservative management with medical expulsive therapy.  The patient has elected to undergo ureteroscopy.  I discussed with the patients risks and benefits and alternatives to ureteroscopy with laser lithotripsy.  The risks include bleeding, infection, injury to the urethra, bladder, ureter or kidney, risk of a staged procedure, risk of stricture, risk of residual fragments, risk of loss of kidney, risks of anesthesia including DVT, PE, MI and death.  I also discussed with the patient the possibility of having a ureteral stent placed.  We discussed stent related symptoms and probable duration of stent placement.  The patient was counseled that the stent is temporary and will be removed either cystoscopically in the office or by external string.The patient understands these risks and wishes to proceed with ureteroscopy.\par \par The plan is:\par 1. We will send a UA and urine culture\par 2. We will schedule the patient for ureteroscopy\par 3. We will arrange pre-operative evaluation\par 4. The patient knows to contact me with worsening pain, nausea/vomiting.  The patient knows to present to the ER for fevers/chills.

## 2023-07-14 NOTE — HISTORY OF PRESENT ILLNESS
[FreeTextEntry1] : 56 yo male with recurrent nephrolithiasis. He passed a stone about 8 years ago and had a cystolitholapaxy in 2018. He presented to the ED 2 weeks ago with severe left flank pain. renal us showing bilateral hydro but his cr was wnl and he was making urine so he was dc on abx. His urine cx was negative. \par \par He is here today to review the results of his CT abdomen and pelvis.  He states that he is still voiding but still has a weak stream with the tamsulosin.  He states his pain has resolved.  His CT abdomen pelvis from July 14, 2023 demonstrates a 9 mm obstructing left ureteral calculus and bilateral intrarenal stones.

## 2023-07-21 ENCOUNTER — OUTPATIENT (OUTPATIENT)
Dept: OUTPATIENT SERVICES | Facility: HOSPITAL | Age: 58
LOS: 1 days | End: 2023-07-21
Payer: COMMERCIAL

## 2023-07-21 VITALS
OXYGEN SATURATION: 100 % | RESPIRATION RATE: 17 BRPM | DIASTOLIC BLOOD PRESSURE: 86 MMHG | SYSTOLIC BLOOD PRESSURE: 119 MMHG | HEART RATE: 83 BPM | TEMPERATURE: 98 F | WEIGHT: 138.01 LBS | HEIGHT: 60 IN

## 2023-07-21 DIAGNOSIS — I10 ESSENTIAL (PRIMARY) HYPERTENSION: ICD-10-CM

## 2023-07-21 DIAGNOSIS — N21.0 CALCULUS IN BLADDER: Chronic | ICD-10-CM

## 2023-07-21 DIAGNOSIS — N20.0 CALCULUS OF KIDNEY: ICD-10-CM

## 2023-07-21 DIAGNOSIS — Z98.89 OTHER SPECIFIED POSTPROCEDURAL STATES: Chronic | ICD-10-CM

## 2023-07-21 DIAGNOSIS — Z01.818 ENCOUNTER FOR OTHER PREPROCEDURAL EXAMINATION: ICD-10-CM

## 2023-07-21 DIAGNOSIS — E78.5 HYPERLIPIDEMIA, UNSPECIFIED: ICD-10-CM

## 2023-07-21 LAB
APPEARANCE UR: CLEAR — SIGNIFICANT CHANGE UP
BACTERIA # UR AUTO: ABNORMAL /HPF
BILIRUB UR-MCNC: NEGATIVE — SIGNIFICANT CHANGE UP
COLOR SPEC: YELLOW — SIGNIFICANT CHANGE UP
DIFF PNL FLD: ABNORMAL
EPI CELLS # UR: ABNORMAL /HPF
GLUCOSE UR QL: NEGATIVE — SIGNIFICANT CHANGE UP
KETONES UR-MCNC: NEGATIVE — SIGNIFICANT CHANGE UP
LEUKOCYTE ESTERASE UR-ACNC: ABNORMAL
NITRITE UR-MCNC: NEGATIVE — SIGNIFICANT CHANGE UP
PH UR: 6 — SIGNIFICANT CHANGE UP (ref 5–8)
PROT UR-MCNC: NEGATIVE — SIGNIFICANT CHANGE UP
RBC CASTS # UR COMP ASSIST: ABNORMAL /HPF (ref 0–2)
SP GR SPEC: 1.01 — SIGNIFICANT CHANGE UP (ref 1.01–1.02)
UROBILINOGEN FLD QL: NEGATIVE — SIGNIFICANT CHANGE UP
WBC UR QL: ABNORMAL /HPF (ref 0–5)

## 2023-07-21 NOTE — H&P PST ADULT - MUSCULOSKELETAL
unknown negative normal/ROM intact/normal gait/strength 5/5 bilateral upper extremities/strength 5/5 bilateral lower extremities

## 2023-07-21 NOTE — H&P PST ADULT - NSICDXPASTSURGICALHX_GEN_ALL_CORE_FT
PAST SURGICAL HISTORY:  Calcium stone of bladder     S/P correction of deviated nasal septum     S/P LASIK surgery

## 2023-07-21 NOTE — H&P PST ADULT - PROBLEM SELECTOR PLAN 3
scheduled for cystoscopy bilateral ureteroscopy with laser lithotripsy and ureteral stent insertion.    Pt instructed to be NPO the night before and the morning of surgery except for med with sip of water am. Provided with chlorhexidene 4% solution to wash for 3 days including the morning of surgery. Written instructions given and reviewed with pt. Escort required post procedure. Tylenol to be used only if needed one week prior to surgery.    Stop Bang Score=3 Pt intermediate risk for MOLLY

## 2023-07-21 NOTE — H&P PST ADULT - NSICDXPROCEDURE_GEN_ALL_CORE_FT
PROCEDURES:  Cystoscopy, with ureteroscopic laser lithotripsy and stent insertion 21-Jul-2023 10:17:10  Candice Gr

## 2023-07-21 NOTE — H&P PST ADULT - BIRTH SEX
Per Dr. Ruiz - \"Lab and fibroscan results reviewed, labs normal, fibroscan also normal. No further follow up with Hepatology is needed at this time.\" Contacted patient and relayed results. Instructed patient to continue following up with PCP. Patient verbalized understanding.   Male

## 2023-07-21 NOTE — H&P PST ADULT - ASSESSMENT
57 yr old pleasant male history of HTN, HDL, nephrolithiasis presents today with calculus of kidney. Pt stated one month ago had gone to [facility] ER for abdominal pain, hematuria and lower left pain.  Pt had sonogram and was told of stone and referred to urologist. Pt had consult and CT Scan performed and noted bilateral stones. Pt scheduled for cystoscopy bilateral ureteroscopy with laser lithotripsy and ureteral stent insertion on 7/25/2023.

## 2023-07-21 NOTE — H&P PST ADULT - NSICDXPASTMEDICALHX_GEN_ALL_CORE_FT
PAST MEDICAL HISTORY:  Calculus of kidney     HLD (hyperlipidemia)     HTN (hypertension)     Renal colic

## 2023-07-21 NOTE — H&P PST ADULT - HISTORY OF PRESENT ILLNESS
57 yr old pleasant male history of HTN, HDL, nephrolithiasis presents today with calculus of kidney. Pt stated one month ago had gone to [facility] ER for abdominal pain, hematuria and lower left pain.  Pt had sonogram and was told of stone and referred to urologist. Pt had consult and CT Scan performed and noted bilateral stones. Pt scheduled for cystoscopy bilateral ureteroscopy with laser lithotripsy and ureteral stent insertion on 7/25/2023.

## 2023-07-21 NOTE — H&P PST ADULT - NSICDXFAMILYHX_GEN_ALL_CORE_FT
FAMILY HISTORY:  Father  Still living? Unknown  Family history of diabetes mellitus (DM), Age at diagnosis: Age Unknown  FH: HTN (hypertension), Age at diagnosis: Age Unknown    Mother  Still living? No  Family history of diabetes mellitus (DM), Age at diagnosis: Age Unknown  FH: HTN (hypertension), Age at diagnosis: Age Unknown

## 2023-07-22 LAB
CULTURE RESULTS: NO GROWTH — SIGNIFICANT CHANGE UP
SPECIMEN SOURCE: SIGNIFICANT CHANGE UP

## 2023-07-24 ENCOUNTER — TRANSCRIPTION ENCOUNTER (OUTPATIENT)
Age: 58
End: 2023-07-24

## 2023-07-24 PROCEDURE — G0463: CPT

## 2023-07-25 ENCOUNTER — TRANSCRIPTION ENCOUNTER (OUTPATIENT)
Age: 58
End: 2023-07-25

## 2023-07-25 ENCOUNTER — RESULT REVIEW (OUTPATIENT)
Age: 58
End: 2023-07-25

## 2023-07-25 ENCOUNTER — OUTPATIENT (OUTPATIENT)
Dept: OUTPATIENT SERVICES | Facility: HOSPITAL | Age: 58
LOS: 1 days | End: 2023-07-25
Payer: COMMERCIAL

## 2023-07-25 ENCOUNTER — APPOINTMENT (OUTPATIENT)
Dept: UROLOGY | Facility: HOSPITAL | Age: 58
End: 2023-07-25

## 2023-07-25 VITALS
HEART RATE: 76 BPM | SYSTOLIC BLOOD PRESSURE: 129 MMHG | OXYGEN SATURATION: 100 % | TEMPERATURE: 98 F | WEIGHT: 138.01 LBS | HEIGHT: 60 IN | RESPIRATION RATE: 16 BRPM | DIASTOLIC BLOOD PRESSURE: 79 MMHG

## 2023-07-25 VITALS
TEMPERATURE: 98 F | DIASTOLIC BLOOD PRESSURE: 75 MMHG | RESPIRATION RATE: 16 BRPM | OXYGEN SATURATION: 98 % | SYSTOLIC BLOOD PRESSURE: 116 MMHG | HEART RATE: 84 BPM

## 2023-07-25 DIAGNOSIS — N20.0 CALCULUS OF KIDNEY: ICD-10-CM

## 2023-07-25 DIAGNOSIS — Z98.89 OTHER SPECIFIED POSTPROCEDURAL STATES: Chronic | ICD-10-CM

## 2023-07-25 DIAGNOSIS — Z01.818 ENCOUNTER FOR OTHER PREPROCEDURAL EXAMINATION: ICD-10-CM

## 2023-07-25 DIAGNOSIS — N21.0 CALCULUS IN BLADDER: Chronic | ICD-10-CM

## 2023-07-25 PROCEDURE — C1769: CPT

## 2023-07-25 PROCEDURE — 52356 CYSTO/URETERO W/LITHOTRIPSY: CPT | Mod: LT

## 2023-07-25 PROCEDURE — 52317 REMOVE BLADDER STONE: CPT | Mod: 59

## 2023-07-25 PROCEDURE — C1758: CPT

## 2023-07-25 PROCEDURE — 88300 SURGICAL PATH GROSS: CPT | Mod: 26

## 2023-07-25 PROCEDURE — C2617: CPT

## 2023-07-25 PROCEDURE — C1747: CPT

## 2023-07-25 PROCEDURE — C1889: CPT

## 2023-07-25 PROCEDURE — 88300 SURGICAL PATH GROSS: CPT

## 2023-07-25 PROCEDURE — 52317 REMOVE BLADDER STONE: CPT | Mod: XU

## 2023-07-25 PROCEDURE — ZZZZZ: CPT

## 2023-07-25 PROCEDURE — 82365 CALCULUS SPECTROSCOPY: CPT

## 2023-07-25 PROCEDURE — 76000 FLUOROSCOPY <1 HR PHYS/QHP: CPT

## 2023-07-25 DEVICE — URETERAL SHEATH NAVIGATOR HD 12/14FR X 28CM: Type: IMPLANTABLE DEVICE | Status: FUNCTIONAL

## 2023-07-25 DEVICE — URETEROSCOPE LITHOVUE DISP: Type: IMPLANTABLE DEVICE | Status: FUNCTIONAL

## 2023-07-25 DEVICE — STONE BASKET ZEROTIP NITINOL 4-WIRE 1.9FR 120CM X 12MM: Type: IMPLANTABLE DEVICE | Status: FUNCTIONAL

## 2023-07-25 DEVICE — LASER FIBER FLEXIVA 365 ID: Type: IMPLANTABLE DEVICE | Status: FUNCTIONAL

## 2023-07-25 DEVICE — GUIDEWIRE SENSOR DUAL-FLEX NITINOL STRAIGHT .035" X 150CM: Type: IMPLANTABLE DEVICE | Status: FUNCTIONAL

## 2023-07-25 DEVICE — URETERAL CATH DUAL LUMEN 10FR 54CM: Type: IMPLANTABLE DEVICE | Status: FUNCTIONAL

## 2023-07-25 DEVICE — URETERAL STENT PERCUFLEX PLUS 6FR 24CM: Type: IMPLANTABLE DEVICE | Status: FUNCTIONAL

## 2023-07-25 RX ORDER — FENTANYL CITRATE 50 UG/ML
50 INJECTION INTRAVENOUS
Refills: 0 | Status: DISCONTINUED | OUTPATIENT
Start: 2023-07-25 | End: 2023-07-25

## 2023-07-25 RX ORDER — ACETAMINOPHEN 500 MG
1000 TABLET ORAL ONCE
Refills: 0 | Status: DISCONTINUED | OUTPATIENT
Start: 2023-07-25 | End: 2023-07-25

## 2023-07-25 RX ORDER — ONDANSETRON 8 MG/1
4 TABLET, FILM COATED ORAL ONCE
Refills: 0 | Status: DISCONTINUED | OUTPATIENT
Start: 2023-07-25 | End: 2023-07-25

## 2023-07-25 RX ORDER — SODIUM CHLORIDE 9 MG/ML
3 INJECTION INTRAMUSCULAR; INTRAVENOUS; SUBCUTANEOUS EVERY 8 HOURS
Refills: 0 | Status: DISCONTINUED | OUTPATIENT
Start: 2023-07-25 | End: 2023-07-25

## 2023-07-25 RX ORDER — FENTANYL CITRATE 50 UG/ML
25 INJECTION INTRAVENOUS
Refills: 0 | Status: DISCONTINUED | OUTPATIENT
Start: 2023-07-25 | End: 2023-07-25

## 2023-07-25 RX ORDER — CEPHALEXIN 500 MG
1 CAPSULE ORAL
Qty: 8 | Refills: 0
Start: 2023-07-25 | End: 2023-07-28

## 2023-07-25 RX ORDER — TAMSULOSIN HYDROCHLORIDE 0.4 MG/1
1 CAPSULE ORAL
Refills: 0 | DISCHARGE

## 2023-07-25 RX ORDER — TAMSULOSIN HYDROCHLORIDE 0.4 MG/1
1 CAPSULE ORAL
Qty: 14 | Refills: 0
Start: 2023-07-25

## 2023-07-25 RX ORDER — SODIUM CHLORIDE 9 MG/ML
1000 INJECTION, SOLUTION INTRAVENOUS
Refills: 0 | Status: DISCONTINUED | OUTPATIENT
Start: 2023-07-25 | End: 2023-07-25

## 2023-07-25 RX ADMIN — FENTANYL CITRATE 25 MICROGRAM(S): 50 INJECTION INTRAVENOUS at 16:10

## 2023-07-25 RX ADMIN — FENTANYL CITRATE 25 MICROGRAM(S): 50 INJECTION INTRAVENOUS at 15:55

## 2023-07-25 NOTE — ASU DISCHARGE PLAN (ADULT/PEDIATRIC) - ASU DC SPECIAL INSTRUCTIONSFT
STENT: You may have an internal stent (a hollow tube that runs from the kidney to your bladder) after your procedure, which helps urine drain from the kidney to your bladder. Some patients experience urinary frequency, burning, or even back pain (especially with urination). These sensations will gradually get better. Increasing your fluid intake can also improve these symptoms. While the stent is in place, your urine may continue to be bloody. This stent is temporary and must be removed by your urologist as an outpatient with in 3 months unless otherwise specified. If your stent is on a string, it is secured to your leg or genitalia with an adhesive bandage. Do not pull on the string, do not remove the bandage, do not insert anything intravaginally/intraurethrally, and do not engage in sexual intercourse until after the stent is removed at your post-operative appointment.  GENERAL: It is common to have blood in your urine after your procedure. It may be pink or even red; inform your doctor if you have a significant amount of clot in the urine or if you are unable to void at all. The urine may clear and then become bloody again especially as you are more physically active.  BATHING: You may shower or bathe.  DIET: You may resume your regular diet and regular medication regimen.  PAIN: You may take Tylenol (acetaminophen) 650-975mg and/or Motrin (ibuprofen) 400-600mg, both available over the counter, for pain every 6 hours as needed. Do not exceed 4000mg of Tylenol (acetaminophen) daily. You may alternate these medications such that you take one or the other every 3 hours for around the clock pain coverage. If you have a stent, the following medications may have been sent to your pharmacy for stent related discomfort: Flomax (tamsulosin) 0.4mg at bedtime until stent removed, Ditropan (oxybutynin) 5mg every 8 hours as needed for bladder spasms, and Pyridium (phenasopyridine) 100mg every 8 hours as needed for kidney/bladder discomfort for max 3 days (Pyridium will make your urine orange).  ANTIBIOTICS: You may be given a prescription for an antibiotic, please take this medication as instructed and be sure to complete the entire course.  STOOL SOFTENERS: Do not allow yourself to become constipated as straining may cause bleeding. Take stool softeners or a laxative (ex. Miralax, Colace, Senokot, ExLax, etc), available over the counter, if needed.  ACTIVITY: No heavy lifting or strenuous exercise until you are evaluated at your post-operative appointment. Otherwise, you may return to your usual level of physical activity.  ANTICOAGULATION: If you are taking any blood thinning medications, please discuss with your urologist prior to restarting these medications unless otherwise specified.  FOLLOW-UP: If you did not already schedule your post-operative appointment, please call your urologist to schedule and follow-up appointment.  CALL YOUR UROLOGIST IF: You have any bleeding that does not stop, inability to void >8 hours, fever over 100.4 F, chills, persistent nausea/vomiting, changes in your incision concerning for infection, or if your pain is not controlled on your discharge pain medications.

## 2023-07-25 NOTE — ASU PATIENT PROFILE, ADULT - FALL HARM RISK - UNIVERSAL INTERVENTIONS
Bed in lowest position, wheels locked, appropriate side rails in place/Call bell, personal items and telephone in reach/Instruct patient to call for assistance before getting out of bed or chair/Non-slip footwear when patient is out of bed/[place] to call system/Physically safe environment - no spills, clutter or unnecessary equipment/Purposeful Proactive Rounding/Room/bathroom lighting operational, light cord in reach

## 2023-07-25 NOTE — ASU DISCHARGE PLAN (ADULT/PEDIATRIC) - PROCEDURE
Cystoscopy, left ureteroscopy, laser lithotripsy, basket extraction, retrograde pyelogram, stent placement

## 2023-07-25 NOTE — ASU DISCHARGE PLAN (ADULT/PEDIATRIC) - CALL YOUR DOCTOR IF YOU HAVE ANY OF THE FOLLOWING:
Bleeding that does not stop/Wound/Surgical Site with redness, or foul smelling discharge or pus/Nausea and vomiting that does not stop/Unable to urinate Bleeding that does not stop/Fever greater than (need to indicate Fahrenheit or Celsius)/Wound/Surgical Site with redness, or foul smelling discharge or pus/Nausea and vomiting that does not stop/Unable to urinate

## 2023-07-25 NOTE — ASU DISCHARGE PLAN (ADULT/PEDIATRIC) - CARE PROVIDER_API CALL
Talha Rodriguez  Urology  [address] [place] [address]  Phone: [phone]  Fax: [phone]  Follow Up Time: 2 weeks

## 2023-07-25 NOTE — BRIEF OPERATIVE NOTE - NSICDXBRIEFPROCEDURE_GEN_ALL_CORE_FT
PROCEDURES:  Cystoscopy and ureteroscopy with laser lithotripsy 25-Jul-2023 17:27:00 left , Edilia  Ureter stent placement 25-Jul-2023 17:27:54 left , Edilia

## 2023-07-31 PROBLEM — N20.0 CALCULUS OF KIDNEY: Chronic | Status: ACTIVE | Noted: 2023-07-21

## 2023-08-01 LAB
CELL MATERIAL STONE EST-MCNT: SIGNIFICANT CHANGE UP
LABORATORY COMMENT REPORT: SIGNIFICANT CHANGE UP
NIDUS STONE QN: SIGNIFICANT CHANGE UP

## 2023-08-09 ENCOUNTER — APPOINTMENT (OUTPATIENT)
Dept: UROLOGY | Facility: CLINIC | Age: 58
End: 2023-08-09

## 2023-08-10 ENCOUNTER — APPOINTMENT (OUTPATIENT)
Dept: UROLOGY | Facility: CLINIC | Age: 58
End: 2023-08-10
Payer: COMMERCIAL

## 2023-08-10 VITALS
BODY MASS INDEX: 22.6 KG/M2 | TEMPERATURE: 98.1 F | OXYGEN SATURATION: 98 % | DIASTOLIC BLOOD PRESSURE: 88 MMHG | SYSTOLIC BLOOD PRESSURE: 127 MMHG | WEIGHT: 144 LBS | HEIGHT: 67 IN | HEART RATE: 85 BPM

## 2023-08-10 PROCEDURE — 52310 CYSTOSCOPY AND TREATMENT: CPT

## 2023-08-22 LAB — SURGICAL PATHOLOGY STUDY: SIGNIFICANT CHANGE UP

## 2023-09-06 ENCOUNTER — APPOINTMENT (OUTPATIENT)
Dept: UROLOGY | Facility: CLINIC | Age: 58
End: 2023-09-06
Payer: COMMERCIAL

## 2023-09-06 VITALS
HEIGHT: 67 IN | BODY MASS INDEX: 22.6 KG/M2 | SYSTOLIC BLOOD PRESSURE: 116 MMHG | WEIGHT: 144 LBS | TEMPERATURE: 98.1 F | HEART RATE: 109 BPM | OXYGEN SATURATION: 98 % | DIASTOLIC BLOOD PRESSURE: 76 MMHG

## 2023-09-06 PROCEDURE — 76775 US EXAM ABDO BACK WALL LIM: CPT

## 2023-09-06 PROCEDURE — 99214 OFFICE O/P EST MOD 30 MIN: CPT

## 2023-09-06 NOTE — HISTORY OF PRESENT ILLNESS
[FreeTextEntry1] : 56 yo male with recurrent nephrolithiasis. He passed a stone about 8 years ago and had a cystolitholapaxy in 2018. He underwent left ureteroscopy 1 month ago.  States since then he has had a mild intermittent pain of his left flank.  Denies all urinary symptoms.  He is here to review the results of his renal ultrasound.  We discussed the results demonstrating a 4 mm right interpolar stone without hydronephrosis.  Unable to identify the 3 mm right lower pole stone seen on his previous CT scan.  It also shows a small 2 mm left lower pole stone.

## 2023-09-06 NOTE — ASSESSMENT
[FreeTextEntry1] : 57-year-old male with history of nephrolithiasis and bladder stones.  His renal ultrasound demonstrates a right 4 mm stone.  His previous CT scan did show a 3 mm right lower pole stone which is not seen on his ultrasound today.  He also has a 2 mm left lower pole stone.  He will undergo a KUB x-ray and determine whether he would like ureteroscopy to be done on his right side to have him be stone free.  Plan Images of renal US reviewed and results discussed with patient KUB --> Will call to discuss results and determine if he'd like to proceed with right URS Renal US in 6 month  Stone pathology results reviewed and discussed with patient Discussed dietary modification for stone prevention

## 2023-11-01 RX ORDER — TAMSULOSIN HYDROCHLORIDE 0.4 MG/1
0.4 CAPSULE ORAL
Qty: 30 | Refills: 1 | Status: ACTIVE | COMMUNITY
Start: 2023-07-07 | End: 1900-01-01

## 2024-04-08 ENCOUNTER — APPOINTMENT (OUTPATIENT)
Dept: UROLOGY | Facility: CLINIC | Age: 59
End: 2024-04-08
Payer: COMMERCIAL

## 2024-04-08 VITALS
OXYGEN SATURATION: 98 % | DIASTOLIC BLOOD PRESSURE: 79 MMHG | TEMPERATURE: 97.2 F | HEIGHT: 60 IN | WEIGHT: 140 LBS | SYSTOLIC BLOOD PRESSURE: 113 MMHG | BODY MASS INDEX: 27.48 KG/M2 | HEART RATE: 107 BPM

## 2024-04-08 PROCEDURE — 99214 OFFICE O/P EST MOD 30 MIN: CPT

## 2024-04-08 PROCEDURE — 76775 US EXAM ABDO BACK WALL LIM: CPT

## 2024-04-08 PROCEDURE — G2211 COMPLEX E/M VISIT ADD ON: CPT | Mod: NC,1L

## 2024-04-08 NOTE — HISTORY OF PRESENT ILLNESS
[FreeTextEntry1] : 56 yo male with recurrent nephrolithiasis. He passed a stone about 8 years ago and had a cystolitholapaxy in 2018. He underwent left ureteroscopy July 2023  He is here today to discuss the results of his renal ultrasound which shows bilateral intrarenal stones.  It appears as if his left lower pole stone may be slightly increased in size.  We reviewed his previous KUB which also showed bilateral intrarenal stones.  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  He is currently sexually active with his partner.  He denies any issues with erections.  He states at times he will climax within 1 to 2 minutes.

## 2024-04-08 NOTE — ASSESSMENT
[FreeTextEntry1] : 58-year-old male with history of nephrolithiasis and bladder stones.  His KUB from November 2023 demonstrated bilateral intrarenal stones.  His renal ultrasound done today did demonstrate a slight increase in his left lower pole stone.  He will repeat a KUB to better assess for stone growth.  He will also undergo a 24-hour urine collection to better help guide his stone prevention therapy  Regarding his premature ejaculation.  We discussed behavioral modifications for lengthening time to ejaculation.  He currently is opposed to starting any medications.  Plan  I have reviewed images of patient's renal us and discussed results with patient demonstrating bilateral intrarenal stones with slight increase in size of left lower pole stone I have reviewed images of patient's KUB from Nov 2023 which demonstrated bilateral intrarenal stones  Repeat KUB and will better assess for stone growth compared to last KUB 24 hr urine collection to better guide stone prevention Behavioral modifications for premature ejaculation discussed FU in 2 months to discuss results of kub and 24 hr collection    Patient is being seen today for evaluation and management of a chronic and longitudinal ongoing condition and I am of the primary treating physician.

## 2024-06-13 ENCOUNTER — APPOINTMENT (OUTPATIENT)
Dept: UROLOGY | Facility: CLINIC | Age: 59
End: 2024-06-13
Payer: COMMERCIAL

## 2024-06-13 VITALS
WEIGHT: 140 LBS | DIASTOLIC BLOOD PRESSURE: 89 MMHG | HEART RATE: 96 BPM | TEMPERATURE: 98.1 F | BODY MASS INDEX: 27.48 KG/M2 | SYSTOLIC BLOOD PRESSURE: 137 MMHG | OXYGEN SATURATION: 98 % | HEIGHT: 60 IN

## 2024-06-13 DIAGNOSIS — R34 ANURIA AND OLIGURIA: ICD-10-CM

## 2024-06-13 DIAGNOSIS — N20.0 CALCULUS OF KIDNEY: ICD-10-CM

## 2024-06-13 DIAGNOSIS — R82.991 HYPOCITRATURIA: ICD-10-CM

## 2024-06-13 PROCEDURE — G2211 COMPLEX E/M VISIT ADD ON: CPT | Mod: NC

## 2024-06-13 PROCEDURE — 99214 OFFICE O/P EST MOD 30 MIN: CPT

## 2024-06-13 NOTE — HISTORY OF PRESENT ILLNESS
[FreeTextEntry1] : 59 yo male with recurrent nephrolithiasis. He passed a stone about 8 years ago and had a cystolitholapaxy in 2018. He underwent left ureteroscopy July 2023  He is follow-up renal ultrasound showed bilateral intrarenal stones with marginal increase in size.  He is here today to discuss repeat KUB demonstrating bilateral intrarenal stones again without any significant increase in size.  He did undergo a 24-hour urine collection which we discussed today demonstrating low urine volume and hypocitraturia.

## 2024-06-13 NOTE — ASSESSMENT
[FreeTextEntry1] : 58-year-old male with history of nephrolithiasis and bladder stones.  His KUB from November 2023 demonstrated bilateral intrarenal stones.  His follow-up renal ultrasound and KUB have been reviewed and discussed demonstrating marginal increase in size of bilateral intrarenal stones.  We reviewed and discussed his 24-hour urine collection demonstrating low urine volume and hypocitraturia.  We discussed dietary modifications including increased fluid intake to produce at least 2 L of urine per day and increasing citrus fruits.  I also discussed with him starting stone prevention supplements with either Moonstone or litholyte  He will follow-up in 6 months for renal ultrasound to assess for any interval increase in stone size  Plan  I have reviewed images of patient's renal us and discussed results with patient demonstrating bilateral intrarenal stones with slight increase in size of left lower pole stone I have reviewed images of patient's KUB and discussed results with patient demonstrating bilateral intrarenal stones without any significant increase in number or size 24 hr urine collection reviewed and discussed with the patient demonstrating low urine volume and hypocitraturia Dietary modifications discussed including increased fluid intake and increase citrus fruit OTC Moonstone or litholyte Follow-up in 6 months for renal ultrasound   Patient is being seen today for evaluation and management of a chronic and longitudinal ongoing condition and I am of the primary treating physician.

## 2024-09-04 NOTE — H&P PST ADULT - ENMT
no gross abnormalities Abridged Text (If No Specifics Are Selected): Educational resources were provided and detailed information can be found on the patient education handout. Detail Level: Simple Render Patient Education In Note?: render abridged patient education Manual Actinic Keratosis Counseling: Patient was given both written and verbal instructions to avoid direct sunlight for 48 hours after procedure, to wear sunscreen and a large brim hat negative

## 2024-12-13 ENCOUNTER — APPOINTMENT (OUTPATIENT)
Dept: UROLOGY | Facility: CLINIC | Age: 59
End: 2024-12-13
Payer: COMMERCIAL

## 2024-12-13 VITALS
OXYGEN SATURATION: 98 % | SYSTOLIC BLOOD PRESSURE: 161 MMHG | TEMPERATURE: 97.3 F | DIASTOLIC BLOOD PRESSURE: 105 MMHG | HEART RATE: 104 BPM

## 2024-12-13 DIAGNOSIS — N20.0 CALCULUS OF KIDNEY: ICD-10-CM

## 2024-12-13 DIAGNOSIS — R82.991 HYPOCITRATURIA: ICD-10-CM

## 2024-12-13 PROCEDURE — G2211 COMPLEX E/M VISIT ADD ON: CPT | Mod: NC

## 2024-12-13 PROCEDURE — 76775 US EXAM ABDO BACK WALL LIM: CPT

## 2024-12-13 PROCEDURE — 99213 OFFICE O/P EST LOW 20 MIN: CPT

## 2025-01-14 NOTE — ED ADULT NURSE NOTE - PSH
S/P correction of deviated nasal septum    S/P LASIK surgery
Patient requests all Lab, Cardiology, and Radiology Results on their Discharge Instructions

## (undated) DEVICE — SYR ASEPTO

## (undated) DEVICE — VENODYNE/SCD SLEEVE CALF MEDIUM

## (undated) DEVICE — TUBING TUR 2 PRONG

## (undated) DEVICE — IRR BULB PATHFINDER + 10"

## (undated) DEVICE — BOSTON SCIENTIFIC UROLOK II SCOPE ADAPTOR

## (undated) DEVICE — GLV 8 PROTEXIS (WHITE)

## (undated) DEVICE — GLV 7.5 PROTEXIS (WHITE)

## (undated) DEVICE — SOL IRR POUR H2O 1500ML

## (undated) DEVICE — WARMING BLANKET UPPER ADULT

## (undated) DEVICE — GLV 6.5 PROTEXIS (WHITE)

## (undated) DEVICE — SOL IRR BAG H2O 3000ML

## (undated) DEVICE — DRAPE EQUIPMENT BANDED BAG 30 X 30" (SHOWER CAP)

## (undated) DEVICE — DRAPE C ARM UNIVERSAL

## (undated) DEVICE — GLV 7 PROTEXIS (WHITE)

## (undated) DEVICE — SOL IRR BAG NS 0.9% 3000ML

## (undated) DEVICE — ACMI SELF-SEALING SEAL UP TO 7FR

## (undated) DEVICE — POSITIONER FOAM EGG CRATE ULNAR 2PCS (PINK)

## (undated) DEVICE — TUBING RANGER FLUID IRRIGATION SET DISP

## (undated) DEVICE — VENODYNE/SCD SLEEVE CALF LARGE

## (undated) DEVICE — FOLEY TRAY 16FR 5CC LTX UMETER CLOSED

## (undated) DEVICE — POSITIONER HEAD REST PRONE

## (undated) DEVICE — SOL IRR POUR NS 0.9% 500ML

## (undated) DEVICE — FOLEY HOLDER STATLOCK 2 WAY ADULT

## (undated) DEVICE — PACK CYSTO